# Patient Record
Sex: FEMALE | Race: WHITE
[De-identification: names, ages, dates, MRNs, and addresses within clinical notes are randomized per-mention and may not be internally consistent; named-entity substitution may affect disease eponyms.]

---

## 2018-12-07 ENCOUNTER — HOSPITAL ENCOUNTER (EMERGENCY)
Dept: HOSPITAL 74 - FER | Age: 73
Discharge: HOME | End: 2018-12-07
Payer: COMMERCIAL

## 2018-12-07 VITALS — BODY MASS INDEX: 25.7 KG/M2

## 2018-12-07 VITALS — HEART RATE: 84 BPM | DIASTOLIC BLOOD PRESSURE: 69 MMHG | TEMPERATURE: 98.1 F | SYSTOLIC BLOOD PRESSURE: 139 MMHG

## 2018-12-07 DIAGNOSIS — M79.89: Primary | ICD-10-CM

## 2018-12-07 LAB
ALBUMIN SERPL-MCNC: 4 G/DL (ref 3.5–5)
ALP SERPL-CCNC: 58 U/L (ref 32–92)
ALT SERPL-CCNC: 21 U/L (ref 10–40)
ANION GAP SERPL CALC-SCNC: 6 MMOL/L (ref 8–16)
AST SERPL-CCNC: 30 U/L (ref 10–42)
BASOPHILS # BLD: 1.2 % (ref 0–2)
BILIRUB SERPL-MCNC: 0.6 MG/DL (ref 0.2–1)
BUN SERPL-MCNC: 12 MG/DL (ref 7–18)
CALCIUM SERPL-MCNC: 9.4 MG/DL (ref 8.4–10.2)
CHLORIDE SERPL-SCNC: 105 MMOL/L (ref 98–107)
CO2 SERPL-SCNC: 29 MMOL/L (ref 22–28)
CREAT SERPL-MCNC: 0.7 MG/DL (ref 0.6–1.3)
DEPRECATED RDW RBC AUTO: 12.8 % (ref 11.6–15.6)
EOSINOPHIL # BLD: 2.3 % (ref 0–4.5)
GLUCOSE SERPL-MCNC: 88 MG/DL (ref 74–106)
HCT VFR BLD CALC: 42.4 % (ref 32.4–45.2)
HGB BLD-MCNC: 13.9 GM/DL (ref 10.7–15.3)
INR BLD: 1.04 (ref 0.82–1.09)
LYMPHOCYTES # BLD: 31 % (ref 8–40)
MCH RBC QN AUTO: 31.3 PG (ref 25.7–33.7)
MCHC RBC AUTO-ENTMCNC: 32.7 G/DL (ref 32–36)
MCV RBC: 95.7 FL (ref 80–96)
MONOCYTES # BLD AUTO: 9.2 % (ref 3.8–10.2)
NEUTROPHILS # BLD: 56.3 % (ref 42.8–82.8)
PLATELET # BLD AUTO: 297 K/MM3 (ref 134–434)
PMV BLD: 9.1 FL (ref 7.5–11.1)
POTASSIUM SERPLBLD-SCNC: 3.4 MMOL/L (ref 3.5–5.1)
PROT SERPL-MCNC: 7.5 G/DL (ref 6.4–8.3)
PT PNL PPP: 11.6 SEC (ref 10.2–13)
RBC # BLD AUTO: 4.43 M/MM3 (ref 3.6–5.2)
SODIUM SERPL-SCNC: 140 MMOL/L (ref 136–145)
WBC # BLD AUTO: 6.9 K/MM3 (ref 4–10.8)

## 2018-12-07 NOTE — PDOC
History of Present Illness





- General


History Source: Patient


Exam Limitations: No Limitations





- History of Present Illness


Initial Comments: 





12/07/18 20:44


The patient is a 73 year old female here today for evaluation of left leg 

edema. The patient reports that 1.5 weeks she picked up a box and scraped her 

leg. She reports going to a dermatologist a couple of days ago and was given 

porcine and penicillin. She notes that her edema began today with associated 

mild tenderness and purple discoloration. 





Patient denies headache, lightheadedness. Denies fever, chills. Denies chest 

pain, shortness of breath. 





PAST MEDICAL HISTORY:  no significant history





PAST SURGICAL HISTORY:  no significant history





FAMILY HISTORY:  no pertinent history





SOCIAL HISTORY:  Pt lives with  family and is employed.





MEDICATIONS:  reviewed





ALLERGIES:  As per nursing notes





General:  No fevers or chills, no weakness, no weight loss 


HEENT: No change in vision.  No sore throat,. No ear pain


CardioVascular:  No chest pain or shortness of breath


Respiratory:No cough, or wheezing. 


Gastrointestinal:  no nausea, vomiting, diarrhea or constipation,  No rectal 

bleeding


Genitourinary:  No dysuria, hematuria, or frequency


Musculoskeletal:  +Left lower leg edema and mild tenderness. 


Neurologic: No headache, vertigo, dizziness or loss of consciousness


Psychiatric: nor depression 


Skin: No rashes or easy bruising


Endocrine: no increased thirst or abnormal weight change


Allergic: no skin or latex allergy


All other systems reviewed and normal





GENERAL: The patient is awake, alert, and fully oriented, in no acute distress.


HEAD: Normal with no signs of trauma.


EYES: Pupils equal, round and reactive to light, extraocular movements intact, 

sclera anicteric, conjunctiva clear.


EXTREMITIES: +2+ edema of left lower extremity to mid calf with no calf 

tenderness. +ecchymosis of lower left leg and foot with mild diffuse tenderness 

to palpation. 2+pedis pulses. Foot warm and well diffused no bony tenderness. 


NEUROLOGICAL: Normal speech, normal gait.


PSYCH: Normal mood, normal affect.


SKIN: Warm, Dry, normal turgor, no rashes or lesions noted.








<Trevor Grande - Last Filed: 12/07/18 20:44>





- General


History Source: Patient


Exam Limitations: No Limitations





- History of Present Illness


Initial Comments: 





A portion of this note was documented by scribe services under my direction. I 

have reviewed the details of the note, within reason, and agree with the 

documentation with the following case summary and management plan written by 

me. 





Patient treated in the ED.





Nursing notes are reviewed and incorporated into the medical decision-making.


Vital signs reviewed.





Assessment plan: This is a 73-year-old female who comes in complaining of left 

lower extremity swelling patient has a history of trauma approximately week and 

a half ago however denies any trauma recently. Patient noticed that when she 

got home from work today her foot was swollen with some associated ecchymosis.





Workup was initiated including CBC, and coags in addition to an ultrasound 

Doppler





Patient had normal coags the BLOCK DOPPLER WAS NEGATIVE FOR DVT AND HER CBC AND 

COMP WERE 


 normal.





Patient discharged home will follow-up with her primary care doctor


12/07/18 21:53








<Chris Manriquez - Last Filed: 12/07/18 21:56>





- General


Chief Complaint: Edema


Stated Complaint: LT LEG SWELLING


Time Seen by Provider: 12/07/18 20:27





Past History





<Trevor Grande - Last Filed: 12/07/18 20:44>





<Chris Manriquez - Last Filed: 12/07/18 21:56>





- Past Medical History


Allergies/Adverse Reactions: 


 Allergies











Allergy/AdvReac Type Severity Reaction Status Date / Time


 


No Known Allergies Allergy   Unverified 12/07/18 20:25











Home Medications: 


Ambulatory Orders





Potassium Chloride [Klor-Con 10] 10 meq PO DAILY 12/07/18 


Prednisolone [Millipred] 5 mg PO DAILY 12/07/18 


Triamterene/Hydrochlorothiazid [Triamterene-Hctz 37.5-25 mg Cp] 1 each PO DAILY 

12/07/18 











*Physical Exam





- Vital Signs


 Last Vital Signs











Temp Pulse Resp BP Pulse Ox


 


 98.1 F   84   16   139/69   100 


 


 12/07/18 20:27  12/07/18 20:27  12/07/18 20:27  12/07/18 20:27  12/07/18 20:27














<Trevor Grande - Last Filed: 12/07/18 20:44>





Moderate Sedation





- Procedure Monitoring


Vital Signs: 


Procedure Monitoring Vital Signs











Temperature  98.1 F   12/07/18 20:27


 


Pulse Rate  84   12/07/18 20:27


 


Respiratory Rate  16   12/07/18 20:27


 


Blood Pressure  139/69   12/07/18 20:27


 


O2 Sat by Pulse Oximetry (%)  100   12/07/18 20:27











<Trevor Grande - Last Filed: 12/07/18 20:44>





ED Treatment Course





- LABORATORY


CBC & Chemistry Diagram: 


 12/07/18 20:51





 12/07/18 20:51





<Chris Manriquez - Last Filed: 12/07/18 21:56>





*DC/Admit/Observation/Transfer





- Attestations


Scribe Attestion: 





12/07/18 20:45





Documentation prepared by ALOK Childers, acting as medical scribe for 

Chris Manriquez MD.





<Trevor Grande - Last Filed: 12/07/18 20:44>





- Discharge Dispostion


Decision to Admit order: No





<Chris Manriquez - Last Filed: 12/07/18 21:56>


Diagnosis at time of Disposition: 


 Swelling of left lower extremity








- Discharge Dispostion


Disposition: HOME


Condition at time of disposition: Stable





- Referrals


Referrals: 


Mao Faulkner MD [Primary Care Provider] - 





- Patient Instructions


Additional Instructions: 


Your workup was negative for any acute process that could be causing the 

swelling. There is no blood clotting your leg and your chemistries and coags 

were normal





Return to the emergency department immediately with ANY new, persistent or 

worsening symptoms.





Continue any medications as previously prescribed by your physician.





You should follow up with your primary doctor as soon as possible regarding 

today's emergency department visit.


.


Please make sure your doctor reviews the results of your emergency evaluation.





Thank you for coming to the   Emergency Department today for your care. It was 

a pleasure to see you today. Please note that your evaluation is INCOMPLETE 

until you  follow-up with your doctor. 





- Post Discharge Activity

## 2022-10-20 ENCOUNTER — APPOINTMENT (OUTPATIENT)
Dept: OTOLARYNGOLOGY | Facility: CLINIC | Age: 77
End: 2022-10-20

## 2022-10-20 VITALS — BODY MASS INDEX: 25.52 KG/M2 | HEIGHT: 60 IN | TEMPERATURE: 97.3 F | WEIGHT: 130 LBS

## 2022-10-20 DIAGNOSIS — H61.23 IMPACTED CERUMEN, BILATERAL: ICD-10-CM

## 2022-10-20 DIAGNOSIS — K21.9 GASTRO-ESOPHAGEAL REFLUX DISEASE W/OUT ESOPHAGITIS: ICD-10-CM

## 2022-10-20 PROBLEM — Z00.00 ENCOUNTER FOR PREVENTIVE HEALTH EXAMINATION: Status: ACTIVE | Noted: 2022-10-20

## 2022-10-20 PROCEDURE — 31575 DIAGNOSTIC LARYNGOSCOPY: CPT

## 2022-10-20 PROCEDURE — 92550 TYMPANOMETRY & REFLEX THRESH: CPT | Mod: 52

## 2022-10-20 PROCEDURE — 92557 COMPREHENSIVE HEARING TEST: CPT

## 2022-10-20 PROCEDURE — 99204 OFFICE O/P NEW MOD 45 MIN: CPT | Mod: 25

## 2022-10-20 RX ORDER — LISINOPRIL 10 MG/1
10 TABLET ORAL
Qty: 90 | Refills: 0 | Status: ACTIVE | COMMUNITY
Start: 2022-03-16

## 2022-10-20 RX ORDER — LISINOPRIL 5 MG/1
5 TABLET ORAL
Qty: 135 | Refills: 0 | Status: ACTIVE | COMMUNITY
Start: 2022-02-03

## 2022-10-20 RX ORDER — ALBUTEROL SULFATE 90 UG/1
108 (90 BASE) INHALANT RESPIRATORY (INHALATION)
Qty: 8 | Refills: 0 | Status: ACTIVE | COMMUNITY
Start: 2022-02-24

## 2022-10-20 RX ORDER — FUROSEMIDE 20 MG/1
20 TABLET ORAL
Qty: 90 | Refills: 0 | Status: ACTIVE | COMMUNITY
Start: 2021-12-01

## 2022-10-20 RX ORDER — PREDNISONE 1 MG/1
1 TABLET ORAL
Qty: 60 | Refills: 0 | Status: ACTIVE | COMMUNITY
Start: 2021-09-23

## 2022-10-20 RX ORDER — GABAPENTIN 300 MG/1
300 CAPSULE ORAL
Qty: 90 | Refills: 0 | Status: ACTIVE | COMMUNITY
Start: 2022-08-16

## 2022-10-20 RX ORDER — TRIAMCINOLONE ACETONIDE 1 MG/G
0.1 CREAM TOPICAL
Qty: 90 | Refills: 0 | Status: ACTIVE | COMMUNITY
Start: 2022-05-19

## 2022-10-20 RX ORDER — SODIUM FLUORIDE 6 MG/ML
1.1 PASTE DENTAL
Qty: 100 | Refills: 0 | Status: ACTIVE | COMMUNITY
Start: 2022-10-09

## 2022-10-20 NOTE — HISTORY OF PRESENT ILLNESS
[de-identified] : HENOK NICOLE is a 77 year woman with a history of clogged ear and mild dysphagia. She finds that food take longer to go down. She is not coughing.

## 2022-10-20 NOTE — ASSESSMENT
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 She will need imaging.\par She has LPR which may be causing her mild dysphagia. I suggested and discussed in detail a strict reflux diet and gave the patient a handout.\par I am recommending Pepcid 40mg  before bedtime.\par \par

## 2022-10-20 NOTE — REASON FOR VISIT
[Subsequent Evaluation] : a subsequent evaluation for [Hearing Loss] : hearing loss [FreeTextEntry2] : ear,swallowing

## 2022-10-28 DIAGNOSIS — D18.00 HEMANGIOMA UNSPECIFIED SITE: ICD-10-CM

## 2022-11-05 ENCOUNTER — RESULT REVIEW (OUTPATIENT)
Age: 77
End: 2022-11-05

## 2022-11-05 ENCOUNTER — APPOINTMENT (OUTPATIENT)
Dept: CT IMAGING | Facility: HOSPITAL | Age: 77
End: 2022-11-05

## 2022-11-05 ENCOUNTER — TRANSCRIPTION ENCOUNTER (OUTPATIENT)
Age: 77
End: 2022-11-05

## 2022-11-05 ENCOUNTER — OUTPATIENT (OUTPATIENT)
Dept: OUTPATIENT SERVICES | Facility: HOSPITAL | Age: 77
LOS: 1 days | End: 2022-11-05
Payer: MEDICARE

## 2022-11-05 LAB — POCT ISTAT CREATININE: 0.8 MG/DL — SIGNIFICANT CHANGE UP (ref 0.5–1.3)

## 2022-11-05 PROCEDURE — 70481 CT ORBIT/EAR/FOSSA W/DYE: CPT | Mod: MH

## 2022-11-05 PROCEDURE — 82565 ASSAY OF CREATININE: CPT

## 2022-11-05 PROCEDURE — 70481 CT ORBIT/EAR/FOSSA W/DYE: CPT | Mod: 26,MH

## 2022-11-09 ENCOUNTER — APPOINTMENT (OUTPATIENT)
Dept: OTOLARYNGOLOGY | Facility: CLINIC | Age: 77
End: 2022-11-09

## 2022-11-09 VITALS — HEIGHT: 60 IN | WEIGHT: 130 LBS | BODY MASS INDEX: 25.52 KG/M2 | TEMPERATURE: 96 F

## 2022-11-09 DIAGNOSIS — G96.01 CRANIAL CEREBROSPINAL FLUID LEAK, SPONTANEOUS: ICD-10-CM

## 2022-11-09 PROCEDURE — 99214 OFFICE O/P EST MOD 30 MIN: CPT | Mod: 25

## 2022-11-09 PROCEDURE — 69610 TYMPANIC MEMBRANE REPAIR: CPT

## 2022-11-09 NOTE — DATA REVIEWED
[de-identified] : Audiometry October 20 reveals moderate mixed hearing loss in the left ear with large air-bone gaps and flat tympanometry [de-identified] : CT scan reviewed in detail including images.  Opacity of the middle ear and mastoid with tegmen dehiscence.

## 2022-11-09 NOTE — HISTORY OF PRESENT ILLNESS
[de-identified] : HENOK NICOLE has a history of ear pain, swelling and hearing loss for several months.  No otorrhea.  No triggering event or illness reported.  No head injury reported.  No prior history of infectious ear disease.

## 2022-11-09 NOTE — PROCEDURE
[FreeTextEntry3] : Procedure: LEFT Myringotomy for aspiration and beta-2 testing, myringoplasties\par \par Indication: Otitis Media/CSF otorrhea\par \par Management options reviewed in detail.  All risks limitations complications and alternatives reviewed with the patient who agreed.\par Anesthesia:topical phenol tetracaine\par Tube:No Tube\par Findings: serous effusion; yellow with low viscosity.  A pinpoint aspiration was achieved.  Epi disc patch was immediately placed without persistent discharge.\par Complications: None\par Estimated Blood Loss: None\par

## 2022-11-09 NOTE — PHYSICAL EXAM
[Normal] : temporomandibular joint is normal [FreeTextEntry1] : Procedure: Microscopic Ear Exam\par \par Right ear:  \par Ear canal intact without inflammation or lesion.  \par Tympanic membrane intact without inflammation.\par \par Left ear:  \par Ear canal intact without inflammation or lesion. \par Tympanic membrane intact, areas of contusion.  Yellowish effusion fills the middle ear.  No acute inflammation.

## 2022-11-09 NOTE — REASON FOR VISIT
[Initial Evaluation] : an initial evaluation for [Hearing Loss] : hearing loss [Ear Pain] : ear pain [FreeTextEntry2] : swelling

## 2022-11-09 NOTE — ASSESSMENT
[FreeTextEntry1] : Chronic otitis media with effusion in the left ear.  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  CT evidence of dehiscence of the tegmen identified.\par \par Middle ear aspiration was achieved today for testing of beta-2 transferrin (CSF).  Wound care instructions reviewed in detail.  Follow-up in 2 weeks.  Further management to be discussed.

## 2022-11-14 LAB — BETA-2 TRANSFERRIN: NEGATIVE

## 2022-11-23 ENCOUNTER — APPOINTMENT (OUTPATIENT)
Dept: OTOLARYNGOLOGY | Facility: CLINIC | Age: 77
End: 2022-11-23

## 2022-11-23 VITALS — WEIGHT: 130 LBS | TEMPERATURE: 97.7 F | BODY MASS INDEX: 25.52 KG/M2 | HEIGHT: 60 IN

## 2022-11-23 DIAGNOSIS — H90.12 CONDUCTIVE HEARING LOSS, UNILATERAL, LEFT EAR, WITH UNRESTRICTED HEARING ON THE CONTRALATERAL SIDE: ICD-10-CM

## 2022-11-23 DIAGNOSIS — J30.0 VASOMOTOR RHINITIS: ICD-10-CM

## 2022-11-23 PROCEDURE — 99213 OFFICE O/P EST LOW 20 MIN: CPT | Mod: 25

## 2022-11-23 PROCEDURE — 31231 NASAL ENDOSCOPY DX: CPT

## 2022-11-23 PROCEDURE — 69433 CREATE EARDRUM OPENING: CPT

## 2022-11-23 RX ORDER — OFLOXACIN OTIC 3 MG/ML
0.3 SOLUTION AURICULAR (OTIC)
Qty: 1 | Refills: 1 | Status: ACTIVE | COMMUNITY
Start: 2022-11-23 | End: 1900-01-01

## 2022-11-23 NOTE — PROCEDURE
Daily Note     Today's date: 1/10/2022  Patient name: Amarilis Robert  : 1987  MRN: [id number]  Referring provider: Thomas Tilley DO  Dx:   Encounter Diagnosis     ICD-10-CM    1  Left leg pain  M79 605    2  Chronic right hip pain  M25 551     G89 29                   Subjective: I feel that the leg is getting better  It is less sensitive to touch and I can move it more  I still have a lot of pain especially close to the dog bite and the calf feels weak still  Objective: See treatment diary below      Assessment: Tolerated treatment fair  Still has a lot of high irritability close to his injuries, but does get relief from manual techniques  Still needs to work on progressing calf strength especially in weight bearing, as well as range of motion  Patient would benefit from continued PT      Plan: Continue per plan of care        Precautions: multiple dog bites 2 years ago; high irritability; marijuana use       Manuals 1/10             R hip PROM              L ankle PROM              L ankle MREs              IASTM L lat ankle and gastroc 10'               L ankle isometrics 3x5             Neuro Re-Ed              Toe Curls/Spreads 1'x2              Short Foot Holds              Prone hip ext              Bridges              Clamshells              Hip CAR              Table hip 3 way              Mini squats              SLS              Ther Ex              Nustep              Ankle circles               BAPS seated 2x1' ea ap/ml             4 way ankle              Seated heel raises/toe raises 2x1'              Sidestepping              Sit to Stands 3x8 HL table             Active straight leg raise  2x5             Standing gastroc stretch manual 2'              Windshield wipers  1'x2              Ther Activity              Step ups                            Gait Training              Alter G                            Modalities [FreeTextEntry3] : Procedure: LEFT Myringotomy with tube\par \par Indication: Otitis Media\par \par Management options reviewed in detail.  All risks limitations complications and alternatives reviewed with the patient who agreed.\par Anesthesia:topical phenol tetracaine\par Tube:Carlos Beveled Grommet Tube\par Findings: serous effusion \par Complications: None\par Estimated Blood Loss: None\par  [FreeTextEntry6] : PROCEDURE:  NASAL ENDOSCOPY  \par \par Surgeon: Dr. Candelaria\par Indication: Chronic Rhinitis\par Anesthetic: Topical lidocaine and Afrin\par Procedure: The patient was placed in a sitting position.  Following application of the topical anesthetic and decongestant, exam was performed with a flexible endoscope.  The following anatomic sites were directly examined bilaterally in a sequential fashion:\par The scope was introduced in the nasal passage between the middle and inferior turbinates to exam the inferior portion of the middle meatus and the fontanelle, as well as the maxillary ostia. Next, the scope was passed medially and posteriorly to the middle turbinates to examine the sphenoethmoid recess and the superior turbinate region.\par \par Nasopharynx: no masses, choanae patent, no adenoid tissue\par \par The following findings were noted:\par The nasopharynx was clear without mass lesion.  Eustachian tube orifice was nonobstructed bilaterally

## 2022-11-23 NOTE — PHYSICAL EXAM
[Normal] : temporomandibular joint is normal [FreeTextEntry1] : Procedure: Microscopic Ear Exam\par \par Right ear:  \par Ear canal intact without inflammation or lesion.  \par Tympanic membrane intact without inflammation.\par \par Left ear:  \par Ear canal intact without inflammation or lesion. \par Tympanic membrane intact.  Patch partially extruded.  Tympanic membrane dull with reduced mobility.  A middle ear effusion is suspected.  No acute inflammation

## 2022-11-23 NOTE — ASSESSMENT
[FreeTextEntry1] : Persistent middle ear effusions in the left ear with a negative beta-2 transferrin testing.  Eustachian tube dysfunction presumed from chronic rhinitis.\par \par Myringotomy with tube was achieved today.  Wound instructions were reviewed.\par \par Follow-up recommended in approximately 1 month with repeat audiometry.

## 2022-11-23 NOTE — HISTORY OF PRESENT ILLNESS
[de-identified] : HENOK NICOLE has a history of ear pain, swelling and hearing loss for several months. No otorrhea. No triggering event or illness reported. No head injury reported. No prior history of infectious ear disease. \par  \par  [FreeTextEntry1] : 11/23/2022 \par No improvement in hearing reported.  No pain.  No otorrhea.  No headache or systemic symptoms.

## 2022-11-23 NOTE — CONSULT LETTER
[Please see my note below.] : Please see my note below. [FreeTextEntry2] : Dear EMEKA ISLAS  [FreeTextEntry1] : Thank you for allowing me to participate in the care of HENOK NICOLE .\par Please see the attached visit note.\par \par \par \par Austyn Candelaria\par Otology\par Medical Director of [facility]\par Department of Otolaryngology\par [facility]

## 2022-12-07 ENCOUNTER — APPOINTMENT (OUTPATIENT)
Dept: OTOLARYNGOLOGY | Facility: CLINIC | Age: 77
End: 2022-12-07

## 2022-12-07 VITALS — TEMPERATURE: 97.5 F | BODY MASS INDEX: 25.91 KG/M2 | HEIGHT: 60 IN | WEIGHT: 132 LBS

## 2022-12-07 DIAGNOSIS — H65.22 CHRONIC SEROUS OTITIS MEDIA, LEFT EAR: ICD-10-CM

## 2022-12-07 DIAGNOSIS — H93.299 OTHER ABNORMAL AUDITORY PERCEPTIONS, UNSPECIFIED EAR: ICD-10-CM

## 2022-12-07 PROCEDURE — 92550 TYMPANOMETRY & REFLEX THRESH: CPT | Mod: 52

## 2022-12-07 PROCEDURE — 92504 EAR MICROSCOPY EXAMINATION: CPT

## 2022-12-07 PROCEDURE — 99213 OFFICE O/P EST LOW 20 MIN: CPT

## 2022-12-07 PROCEDURE — 92557 COMPREHENSIVE HEARING TEST: CPT

## 2022-12-07 NOTE — PHYSICAL EXAM
[Normal] : temporomandibular joint is normal [FreeTextEntry1] : Procedure: Microscopic Ear Exam\par \par Right ear:  \par Ear canal intact without inflammation or lesion.  \par Tympanic membrane intact without inflammation.\par \par Left ear:  \par Ear canal intact without inflammation or lesion. \par Ventilation tube in place and patent.  No inflammation.  Mild dried blood crusting.

## 2022-12-07 NOTE — ASSESSMENT
[FreeTextEntry1] : Improved hearing following ventilation tube placement in the left ear.  Dry ear precautions reviewed.  Follow-up recommended in 6 months.\par \par Amplification recommended for bilateral hearing loss.

## 2022-12-07 NOTE — DATA REVIEWED
[de-identified] : In light of the patients current symptoms, Complete audiometry was ordered and completed today. I have interpreted these results and reviewed them in detail with the patient.\par \par Improved hearing thresholds with persistent sensorineural hearing loss bilaterally and flat tympanometry in the left ear

## 2022-12-07 NOTE — CONSULT LETTER
[Please see my note below.] : Please see my note below. [FreeTextEntry2] : Dear EMEKA ISLAS  [FreeTextEntry1] : Thank you for allowing me to participate in the care of HENOK NICOLE .\par Please see the attached visit note.\par \par \par \par Austyn Candelaria\par Otology\par Medical Director of [facility]\par Department of Otolaryngology\par [facility]

## 2022-12-07 NOTE — HISTORY OF PRESENT ILLNESS
[de-identified] : HENOK NICOLE has a history of ear pain, swelling and hearing loss for several months. No otorrhea. No triggering event or illness reported. No head injury reported. No prior history of infectious ear disease. \par  \par  [FreeTextEntry1] : 12/07/2022 \par No improvement in hearing reported.  Mild fullness remains. No pain.  No otorrhea.  No headache or systemic symptoms.

## 2023-05-09 ENCOUNTER — RX RENEWAL (OUTPATIENT)
Age: 78
End: 2023-05-09

## 2023-05-09 RX ORDER — FAMOTIDINE 40 MG/1
40 TABLET, FILM COATED ORAL
Qty: 30 | Refills: 3 | Status: ACTIVE | COMMUNITY
Start: 2022-10-20 | End: 1900-01-01

## 2023-06-08 ENCOUNTER — NON-APPOINTMENT (OUTPATIENT)
Age: 78
End: 2023-06-08

## 2023-06-12 ENCOUNTER — APPOINTMENT (OUTPATIENT)
Dept: OTOLARYNGOLOGY | Facility: CLINIC | Age: 78
End: 2023-06-12
Payer: MEDICARE

## 2023-06-12 VITALS — WEIGHT: 132 LBS | HEIGHT: 60 IN | BODY MASS INDEX: 25.91 KG/M2

## 2023-06-12 DIAGNOSIS — T16.2XXA FOREIGN BODY IN LEFT EAR, INITIAL ENCOUNTER: ICD-10-CM

## 2023-06-12 DIAGNOSIS — H90.3 SENSORINEURAL HEARING LOSS, BILATERAL: ICD-10-CM

## 2023-06-12 PROCEDURE — 69200 CLEAR OUTER EAR CANAL: CPT | Mod: LT

## 2023-06-12 PROCEDURE — 99213 OFFICE O/P EST LOW 20 MIN: CPT | Mod: 25

## 2023-06-12 NOTE — ASSESSMENT
[FreeTextEntry1] : Extruded ventilation tube in the left ear with no recurrence of middle ear effusion.  Moderate hearing loss is present bilaterally.  Hearing aid evaluation suggested.\par \par Follow-up with repeat audiometry in 6 months.

## 2023-06-12 NOTE — PHYSICAL EXAM
[Normal] : temporomandibular joint is normal [FreeTextEntry1] : Procedure: LEFT foreign body removal, ear \par \par Indication: Plastic foreign body extruded ventilation tube \par \par Management options reviewed in detail.  All risks limitations complications and alternatives reviewed with the patient who agreed.\par Anesthesia: None \par Findings: Impacted cerumen and foreign body removed with alligator forceps.  No inflammation.   \par Complications: None\par Estimated Blood Loss: None\par \par Procedure: Microscopic Ear Exam\par \par Left ear:  Ear canal intact without inflammation or lesion.  \par Tympanic membrane intact without inflammation.\par \par Right ear:  Ear canal intact without inflammation or lesion.  \par Tympanic membrane intact without inflammation.\par \par

## 2023-06-12 NOTE — HISTORY OF PRESENT ILLNESS
[de-identified] : HENOK NICOLE has a history of ear pain, swelling and hearing loss for several months. No otorrhea. No triggering event or illness reported. No head injury reported. No prior history of infectious ear disease.\par \par November 2022: Left myringotomy with tube [FreeTextEntry1] : 06/12/2023\par No change in hearing reported. no otorrhea. No ear pain.  No perceived change in hearing.  Recently tested.

## 2023-06-12 NOTE — CONSULT LETTER
[Please see my note below.] : Please see my note below. [FreeTextEntry2] : Dear Vikram Feliz [FreeTextEntry1] : Thank you for allowing me to participate in the care of HENOK NICOLE .\par Please see the attached visit note.\par \par \par \par Austyn Candelaria\par Otology\par Medical Director of [facility]\par Department of Otolaryngology\par [facility]

## 2023-06-12 NOTE — DATA REVIEWED
[de-identified] : Audiometry provided from May 2023: Symmetric bilateral sensorineural hearing loss affecting mid and high frequencies.  Impaired speech recognition in the left ear.

## 2023-12-04 ENCOUNTER — APPOINTMENT (OUTPATIENT)
Dept: OTOLARYNGOLOGY | Facility: CLINIC | Age: 78
End: 2023-12-04
Payer: MEDICARE

## 2023-12-04 DIAGNOSIS — H90.8 MIXED CONDUCTIVE AND SENSORINEURAL HEARING LOSS, UNSPECIFIED: ICD-10-CM

## 2023-12-04 PROCEDURE — 99213 OFFICE O/P EST LOW 20 MIN: CPT

## 2023-12-04 PROCEDURE — 92504 EAR MICROSCOPY EXAMINATION: CPT

## 2025-02-20 ENCOUNTER — NON-APPOINTMENT (OUTPATIENT)
Age: 80
End: 2025-02-20

## 2025-02-26 ENCOUNTER — NON-APPOINTMENT (OUTPATIENT)
Age: 80
End: 2025-02-26

## 2025-02-26 ENCOUNTER — APPOINTMENT (OUTPATIENT)
Dept: OTOLARYNGOLOGY | Facility: CLINIC | Age: 80
End: 2025-02-26
Payer: MEDICARE

## 2025-02-26 DIAGNOSIS — H61.20 IMPACTED CERUMEN, UNSPECIFIED EAR: ICD-10-CM

## 2025-02-26 DIAGNOSIS — H90.3 SENSORINEURAL HEARING LOSS, BILATERAL: ICD-10-CM

## 2025-02-26 PROCEDURE — G0268 REMOVAL OF IMPACTED WAX MD: CPT

## 2025-02-26 PROCEDURE — 92567 TYMPANOMETRY: CPT

## 2025-02-26 PROCEDURE — 99213 OFFICE O/P EST LOW 20 MIN: CPT | Mod: 25

## 2025-02-26 PROCEDURE — 92557 COMPREHENSIVE HEARING TEST: CPT

## 2025-03-11 ENCOUNTER — APPOINTMENT (OUTPATIENT)
Dept: OTOLARYNGOLOGY | Facility: CLINIC | Age: 80
End: 2025-03-11